# Patient Record
Sex: MALE | Race: WHITE | NOT HISPANIC OR LATINO | URBAN - METROPOLITAN AREA
[De-identification: names, ages, dates, MRNs, and addresses within clinical notes are randomized per-mention and may not be internally consistent; named-entity substitution may affect disease eponyms.]

---

## 2019-11-11 ENCOUNTER — TELEPHONE (OUTPATIENT)
Dept: PHYSICAL MEDICINE AND REHAB | Facility: CLINIC | Age: 69
End: 2019-11-11

## 2019-11-11 NOTE — TELEPHONE ENCOUNTER
----- Message from Ania Bronson sent at 11/11/2019  2:10 PM CST -----  [facility]  Requesting a call back regarding referral sent over on 10/29/19. She would like to know what is taking so long for patient to be scheduled. Advised her that the referral is still pending authorization. She would like to speak to someone. Please call back at [phone]. She states to press '0' and ask the  to get her to the phone.

## 2019-11-12 ENCOUNTER — OFFICE VISIT (OUTPATIENT)
Dept: PHYSICAL MEDICINE AND REHAB | Facility: CLINIC | Age: 69
End: 2019-11-12
Payer: MEDICARE

## 2019-11-12 VITALS
HEIGHT: 76 IN | DIASTOLIC BLOOD PRESSURE: 77 MMHG | SYSTOLIC BLOOD PRESSURE: 154 MMHG | WEIGHT: 179 LBS | BODY MASS INDEX: 21.8 KG/M2 | HEART RATE: 51 BPM

## 2019-11-12 DIAGNOSIS — R26.9 GAIT ABNORMALITY: ICD-10-CM

## 2019-11-12 DIAGNOSIS — M72.2 PLANTAR FASCIITIS, BILATERAL: ICD-10-CM

## 2019-11-12 DIAGNOSIS — M76.31 ILIOTIBIAL BAND SYNDROME OF RIGHT SIDE: Primary | ICD-10-CM

## 2019-11-12 PROCEDURE — 97110 THERAPEUTIC EXERCISES: CPT | Mod: GN,S$PBB,, | Performed by: PHYSICAL MEDICINE & REHABILITATION

## 2019-11-12 PROCEDURE — 99213 OFFICE O/P EST LOW 20 MIN: CPT | Mod: PBBFAC | Performed by: PHYSICAL MEDICINE & REHABILITATION

## 2019-11-12 PROCEDURE — 99999 PR PBB SHADOW E&M-EST. PATIENT-LVL III: ICD-10-PCS | Mod: PBBFAC,,, | Performed by: PHYSICAL MEDICINE & REHABILITATION

## 2019-11-12 PROCEDURE — 99999 PR PBB SHADOW E&M-EST. PATIENT-LVL III: CPT | Mod: PBBFAC,,, | Performed by: PHYSICAL MEDICINE & REHABILITATION

## 2019-11-12 PROCEDURE — 99204 OFFICE O/P NEW MOD 45 MIN: CPT | Mod: S$PBB,,, | Performed by: PHYSICAL MEDICINE & REHABILITATION

## 2019-11-12 PROCEDURE — 99204 PR OFFICE/OUTPT VISIT, NEW, LEVL IV, 45-59 MIN: ICD-10-PCS | Mod: S$PBB,,, | Performed by: PHYSICAL MEDICINE & REHABILITATION

## 2019-11-12 PROCEDURE — 97110 PR THERAPEUTIC EXERCISES: ICD-10-PCS | Mod: GN,S$PBB,, | Performed by: PHYSICAL MEDICINE & REHABILITATION

## 2019-11-12 NOTE — PROGRESS NOTES
PM&R NEW PATIENT HISTORY & PHYSICAL:    Referring Physician: Self, Aaareferral    Chief Complaint   Patient presents with    Hip Pain     Right hip pain    Knee Pain     Right knee pain       HPI: This is a 69 y.o.  male being seen in clinic today for evaluation of Hip Pain (Right hip pain) and Knee Pain (Right knee pain)   He is an avid runner and hiker. Recently trained for and did a 15 mile trail race in [place] several weeks ago. His last training was a 12 mile walk and he developed some pain in the right knee and hip after that. Knee more than the hip was really aching and stiff by the end of that race. He feels aching pain in his right lateral hip and knee. Remote h/o right knee medial meniscus surgery. Recently had PF in bilateral feet, L > R, but has been doing better recently. The symptoms are improving. He has not tried physical therapy. His son is a PT in [place] Lives in [place]. Going to [employer] in January, considering a trail race in February. Hasn't been back to running for years - first because he had shin splint pain and then developed the plantar fasciitis.     He doesn't currently strength train except for a little bit of upper body exercise.       History obtained from patient.    Past family, medical, social, surgical history, and vital signs reviewed in chart.    Review of Systems   Constitutional: Negative for chills, fever and weight loss.   HENT: Negative for hearing loss and sore throat.    Eyes: Negative for blurred vision, photophobia and pain.   Respiratory: Negative for shortness of breath.    Cardiovascular: Negative for chest pain.   Gastrointestinal: Negative for abdominal pain.   Genitourinary: Negative for dysuria.   Skin: Negative for rash.   Neurological: Negative for tingling and headaches.   Endo/Heme/Allergies: Does not bruise/bleed easily.   Psychiatric/Behavioral: Negative for depression.       General    Nursing note and vitals reviewed.  Constitutional: He is oriented to  person, place, and time. He appears well-developed and well-nourished.   HENT:   Head: Normocephalic and atraumatic.   Eyes: Conjunctivae and EOM are normal. Pupils are equal, round, and reactive to light.   Neck: Neck supple.   Cardiovascular: Intact distal pulses.    Pulmonary/Chest: Effort normal. No respiratory distress.   Abdominal: He exhibits no distension.   Neurological: He is alert and oriented to person, place, and time. He has normal reflexes.   Psychiatric: He has a normal mood and affect.     General Musculoskeletal Exam   Gait: normal     Right Ankle/Foot Exam     Comments:  Mild bilateral bunions. Some mid-foot drop in stance. Poor bilateral balance. TTP near PF origin.      Right Knee Exam     Inspection   Erythema: absent  Swelling: absent  Effusion: absent    Tenderness   The patient is tender to palpation of the iliotibial band (Gerdy's tubercle and over greater trochanter).    Range of Motion   The patient has normal right knee ROM.    Tests   Meniscus   Ismael:  Medial - negative Lateral - negative  Ligament Examination Lachman: normal (-1 to 2mm) PCL-Posterior Drawer: normal (0 to 2mm)     MCL - Valgus: normal (0 to 2mm)  LCL - Varus: normal  Patella   Patellar apprehension: negative  Patellar Tracking: normal    Other   Sensation: normal    Comments:  Single Leg Squat Test:  R: knee adduction, trunk lean, loss of first ray contact and foot touching down  L: knee adduction, trunk lean and hands off hips      Left Knee Exam   Left knee exam is normal.    Inspection   Erythema: absent  Swelling: absent  Effusion: absent    Range of Motion   The patient has normal left knee ROM.    Tests   Meniscus   Ismael:  Medial - negative Lateral - negative  Stability Lachman: normal (-1 to 2mm) PCL-Posterior Drawer: normal (0 to 2mm)  MCL - Valgus: normal (0 to 2mm)  LCL - Varus: normal (0 to 2mm)  Patella   Patellar apprehension: negative  Patellar Tracking: normal    Other   Sensation: normal    Right  Hip Exam   Right hip exam is normal.     Tests   Pain w/ forced internal rotation (SHERI): absent  Doe: positive  Left Hip Exam   Left hip exam is normal.    Tests   Pain w/ forced internal rotation (SHERI): absent          Muscle Strength   Right Lower Extremity   Hip Abduction: 4/5   Hip Adduction: 5/5   Hip Flexion: 5/5   Quadriceps:  5/5   Hamstrin/5   Left Lower Extremity   Hip Abduction: 4/5   Hip Adduction: 5/5   Hip Flexion: 5/5   Quadriceps:  5/5   Hamstrin/5     Reflexes     Left Side  Quadriceps:  2+  Achilles:  2+    Right Side   Quadriceps:  2+  Achilles:  2+      IMPRESSION/PLAN: Jasper is a 69 y.o.  male with:    1. Iliotibial band syndrome of right side  - Ambulatory referral to Physical Therapy    2. Plantar fasciitis, bilateral  - Ambulatory referral to Physical Therapy    3. Gait abnormality       The findings were discussed with Jasper in detail. His symptoms and exam are consistent with plantar fasciitis and right IT band syndrome with some greater trochanteric bursitis as well. We discussed the biomechanical factors he has that contributes to this. He'd really benefit from not just rehab, but working on progressive strengthening in general to help him prevent further injuries and then starting his return to run. We reviewed the attached plan below, but didn't have time to practice in detail. I'm sending him to Reji Galdamez at [facility] to be taught how to perform these exercises correctly. All of his questions were answered. At least 15 minutes were spent teaching the patient a therapeutic home exercise program and they were provided this plan in writing.    He was provided this plan in writing. He will follow-up with me in 6 weeks.       Toe Yoga - The purpose is to give you a smarter, stronger, and more stable foot. The big toe accounts for about 85% of our stability, get it stronger! Start with coordination:  1. Keeping little toes down, lift the big toe.  2. Put big toe down,  lift the small toes.  3. Alternate for 20 - 30 seconds before taking a break.  4. Progress from doing this sitting to standing to single leg stance.        For Foot Strength:  1. Pick the little toes up, drive the big toe down without curling it.  2. Gradually hold this squeezing for longer and longer.  3. Again progress from sitting to standing to single leg stance.  4. Start incorporating this with standing exercises like squats and deadlifts and all single leg work.     For a good primer, check out this link - Are You Ready to go Minimal:     https://www.Inkive.com/watch?v=YtICeFOKjIs    Balance Issues - Balance and Proprioception (ability to feel where the body is in space) is important to keep us upright and keep our joints in good position. The foot has to be able to micro-corrections within a few hundredths of a second while running, otherwise problems will show up somewhere. Fortunately, improving balance is one of the quickest things we can do as it is more about neurological control rather than strength. Toe yoga will help keep the great toe on the ground which will give you better control. Good posture keeps weight on the forefoot so you can control side to side forces better. Standing on one leg multiple times throughout the day will improve balance quickly and even develop some endurance in leg and hip muscles.    To improve, practice many times a day. Find good posture, drive the toe down to stabilize the arch, brace your core, and stand on one leg for 20 - 30 seconds, then the other. Advance from quiet standing, to gentle side to side rotation, to quiet standing with eyes closed, to rotation with eyes closed.       Mobilization:     Its generally a good idea to assess yourself for hotspots once a month by briefly mobilizing everything with a  or foam roller, and then spend several minutes every other day of the week, for 4 - 6 weeks working on those painful spots using thumbs, foam roll,  "rolling pins, lacrosse ball, etc. If it hurts, then you need to do it. Once it no longer hurts, take a break on that area for a few months.     Plantar Fascia Mobilization: Apply pressure to a tender spot using thumbs or ball, then flex & extend toes for 20 seconds before moving to a different spot. Alternatively, roll the entire foot for a minute or two. Do this every other day for several months.          IT Band Mobilization: Using 'the Stick', a foam roller, or lacross ball, roll all aspects of the IT band, but not over bony areas, for a minute or two every other day for about 6 weeks. You can also try pausing over a painful area and then flexing and extending the knee for 20 seconds.     From "Anatomy for Runners" by MAXIMILIANO Stringer        Working through these progressions:    Unless otherwise stated, you should only be doing one exercise from each progression listed below. These are listed in order of difficulty, so start with the first one in each list. Do as many reps as you can while maintaining excellent form. Stop doing the exercise if you fatigue or can't maintain proper form. Once you can do the recommended amount of reps for that exercise, stop doing that one and move on to the next exercise in that progression list during your next workout.     Bridge Progression: The purpose is to practice pushing the leg back using the glutes while maintaining a neutral spine. Start with your foot close enough to touch the heel. Brace your core without letting your back arch or flatten, or rotate for single leg exercises. Squeeze the glutes and drive down through the heel. If you feel tightness in the low back, you need to either brace the core more, use the glutes more, or don't lift the hips quite as high. Arms down is easier, arms up is harder.     1. Double leg bridge with arms up: 3 x 10 - 20        2. Bridge march with arms up: 3 x 20 Don't allow the hips to rotate.        3. Single leg bridge arms up: 3 x 10 - " "15 each leg         Hip Abduction Progression:      Lateral control - This is one of the most common problems with runners and can contribute to many different injuries and wasted energy. Improving hip strength will help significantly. As your hips get stronger, think about actively squeezing your glutes when you run - that will help get these muscles firing. Do only 1 of these exercises per workout and advance to the next exercise once you can do the recommended amount of reps.     1. Clam shells: 3 x 30  Stay perpendicular to ground, knees bent 90 degrees, feet lined up even with low back. Brace core & squeeze glutes throughout the exercise. Lift top knee only as far as you can without rolling backwards.           2. Side lying leg lift (keep leg slightly extended): 3 x 15      3. Seven way hips: Start with 3 x 2 - 3 reps and progress to 3 x 8 - 10 reps. Follow this link:  https://www.Bitmenu.com/watch?v=YdenOdoz-MI      4. Standing hip hikes: 3 x 20  (bonus points: hold core tight, good posture with weight on forefoot, and toe yoga)      Intermediate Glute Max Progression:     With these exercises:  - continue to focus on abdominal bracing  - think about using your foot muscles (toe yoga) to prevent the foot from collapsing in  - keep the middle of the kneecap lined up over the second toe  - good control is more important than the speed of the exercise or how many you do!  - OK to do more than one of these at a time, if your glutes can handle it.    1. Chair of Death squats: 3 x 15   Drive hips back, keeping tibia as vertical as possible. The bar Im holding is to remind me not to flex my spine on the way down, or arch my back on the way up. Think "sit back" rather than "squat down".        2. Lunges: 3 x 10 Keep torso upright and dont let knee go past toes. Do multi-directional lunges for bonus points.      3. Hip Hinges: 3 x 15 Use a braced core to keep the spine in a neutral position. A stick can help you to " know if spine is staying in right position. Think hips back, hips forward. Ok to bend the knees slightly to take stretch off of the hamstrings.       4. Single leg deadlifts: 3 x 10    Remember to keep spine straight as well as the leg you are kicking back. Only once you are sure youre using good form should you drop the juli and pick-up a dumbbell or barbell.       Lower Leg Progression:    1. Double leg heel raises: 3 x 20 (besides just picking the heel up, make sure you also invert it, or turn it in, as you come up.)    2. Single leg heel raise: 3 x 15 (same as above, the heel should invert as you come up.)    3. Bonus level: Eccentric calf raises: 3 x 15 - not sure if it helps tib post as much, but can give you bigger stronger Achilleswhich is essentially a big spring giving you free energy.        5. Single leg kettlebell press: 3 x 10 - 15      6. Single Leg Kettleball Swap: 3 x 30 seconds: See this link  https://www.FRUCT.com/watch?v=hcXKajLSCQs        Safe Start to Running  Unless you have a track nearby or well-known distances, it is much easier to buy a wristwatch and exercise by time. Start with walking a distance/time that you can do without much difficulty.  Aim for 3 days a week. When you can comfortably walk 30 minutes, then you can start running.  Walk 4:30, then jog 30 seconds (this is like one set), and repeat 6 times for a total exercise time of 30 minutes. Alternative method: walk 9 minutes and jog 1 minute, repeat 3 times for 30 minutes total.  Each week decrease the walk time and increase the run time by 30 seconds for each set.  Alternative: As above but change by 1 minute (walk 8 min, jog 2 min, X 3 sets).    As your running time increases, you may want to consider adding an initial walking time to warm-up before the run, but dont count this toward the 30 minute total.  When you can run 30 minutes straight, you may consider adding an additional day of running.  Again, start this fourth  day at less of a total run time, but you should be able to build up time much more quickly.    Always remember the activity guidelines:  1. No running with pain > 3/10 on the 0 - 10 pain scale.  2. No running if limping or changing your gait.  3. Ok to run with a pain that goes away with running, but NOT ok to run with a pain that gets worse the further you go.  4. Increase your total weekly mileage or time by no more than 10%    Its better to go too slow than too fast! Not only do your heart and lungs need to adapt, but it takes your bones, muscles, ligaments, and tendons weeks to months to adapt to the strain of running. You cant rush greatness!    Once you do start running, check on your anai:    Anai - We want to speed up the leg turnover, but take shorter strides so that your pace doesnt change. Once you have warmed up and settled into your run, count how many times one foot hits the ground in 30 seconds, then multiply by 2. Try to get to at least 84, then possibly work up to higher numbers when running faster. If the number is less than 84, try shortening your stride and turning over quicker for a few minutes, and then forget about it - just run! 5 minutes later, repeat the process. Or try running with a metronome idania or maybe one of these apps: http://www.runningmetronome.org/top-5-running-apps/ Gradually youll find your anai picking up.    Suly Douglas M.D.  Physical Medicine and Rehab

## 2019-11-12 NOTE — PATIENT INSTRUCTIONS
Toe Yoga - The purpose is to give you a smarter, stronger, and more stable foot. The big toe accounts for about 85% of our stability, get it stronger! Start with coordination:  1. Keeping little toes down, lift the big toe.  2. Put big toe down, lift the small toes.  3. Alternate for 20 - 30 seconds before taking a break.  4. Progress from doing this sitting to standing to single leg stance.        For Foot Strength:  1. Pick the little toes up, drive the big toe down without curling it.  2. Gradually hold this squeezing for longer and longer.  3. Again progress from sitting to standing to single leg stance.  4. Start incorporating this with standing exercises like squats and deadlifts and all single leg work.     For a good primer, check out this link - Are You Ready to go Minimal:     https://www.[employer].com/watch?v=YtICeFOKjIs    Balance Issues - Balance and Proprioception (ability to feel where the body is in space) is important to keep us upright and keep our joints in good position. The foot has to be able to micro-corrections within a few hundredths of a second while running, otherwise problems will show up somewhere. Fortunately, improving balance is one of the quickest things we can do as it is more about neurological control rather than strength. Toe yoga will help keep the great toe on the ground which will give you better control. Good posture keeps weight on the forefoot so you can control side to side forces better. Standing on one leg multiple times throughout the day will improve balance quickly and even develop some endurance in leg and hip muscles.    To improve, practice many times a day. Find good posture, drive the toe down to stabilize the arch, brace your core, and stand on one leg for 20 - 30 seconds, then the other. Advance from quiet standing, to gentle side to side rotation, to quiet standing with eyes closed, to rotation with eyes closed.       Mobilization:     Its generally a good idea  "to assess yourself for hotspots once a month by briefly mobilizing everything with a  or foam roller, and then spend several minutes every other day of the week, for 4 - 6 weeks working on those painful spots using thumbs, foam roll, rolling pins, lacrosse ball, etc. If it hurts, then you need to do it. Once it no longer hurts, take a break on that area for a few months.     Plantar Fascia Mobilization: Apply pressure to a tender spot using thumbs or ball, then flex & extend toes for 20 seconds before moving to a different spot. Alternatively, roll the entire foot for a minute or two. Do this every other day for several months.          IT Band Mobilization: Using 'the Stick', a foam roller, or lacross ball, roll all aspects of the IT band, but not over bony areas, for a minute or two every other day for about 6 weeks. You can also try pausing over a painful area and then flexing and extending the knee for 20 seconds.     From "Anatomy for Runners" by MAXIMILIANO Stringer        Working through these progressions:    Unless otherwise stated, you should only be doing one exercise from each progression listed below. These are listed in order of difficulty, so start with the first one in each list. Do as many reps as you can while maintaining excellent form. Stop doing the exercise if you fatigue or can't maintain proper form. Once you can do the recommended amount of reps for that exercise, stop doing that one and move on to the next exercise in that progression list during your next workout.     Bridge Progression: The purpose is to practice pushing the leg back using the glutes while maintaining a neutral spine. Start with your foot close enough to touch the heel. Brace your core without letting your back arch or flatten, or rotate for single leg exercises. Squeeze the glutes and drive down through the heel. If you feel tightness in the low back, you need to either brace the core more, use the glutes more, or " "don't lift the hips quite as high. Arms down is easier, arms up is harder.     1. Double leg bridge with arms up: 3 x 10 - 20        2. Bridge march with arms up: 3 x 20 Don't allow the hips to rotate.        3. Single leg bridge arms up: 3 x 10 - 15 each leg         Hip Abduction Progression:      Lateral control - This is one of the most common problems with runners and can contribute to many different injuries and wasted energy. Improving hip strength will help significantly. As your hips get stronger, think about actively squeezing your glutes when you run - that will help get these muscles firing. Do only 1 of these exercises per workout and advance to the next exercise once you can do the recommended amount of reps.     1. Clam shells: 3 x 30  Stay perpendicular to ground, knees bent 90 degrees, feet lined up even with low back. Brace core & squeeze glutes throughout the exercise. Lift top knee only as far as you can without rolling backwards.           2. Side lying leg lift (keep leg slightly extended): 3 x 15      3. Seven way hips: Start with 3 x 2 - 3 reps and progress to 3 x 8 - 10 reps. Follow this link:  https://www.[employer].com/watch?v=YdenOdoz-MI      4. Standing hip hikes: 3 x 20  (bonus points: hold core tight, good posture with weight on forefoot, and toe yoga)      Intermediate Glute Max Progression:     With these exercises:  - continue to focus on abdominal bracing  - think about using your foot muscles (toe yoga) to prevent the foot from collapsing in  - keep the middle of the kneecap lined up over the second toe  - good control is more important than the speed of the exercise or how many you do!  - OK to do more than one of these at a time, if your glutes can handle it.    1. Chair of Death squats: 3 x 15   Drive hips back, keeping tibia as vertical as possible. The bar Im holding is to remind me not to flex my spine on the way down, or arch my back on the way up. Think "sit back" rather than " ""squat down".        2. Lunges: 3 x 10 Keep torso upright and dont let knee go past toes. Do multi-directional lunges for bonus points.      3. Hip Hinges: 3 x 15 Use a braced core to keep the spine in a neutral position. A stick can help you to know if spine is staying in right position. Think hips back, hips forward. Ok to bend the knees slightly to take stretch off of the hamstrings.       4. Single leg deadlifts: 3 x 10    Remember to keep spine straight as well as the leg you are kicking back. Only once you are sure youre using good form should you drop the juli and pick-up a dumbbell or barbell.       Lower Leg Progression:    1. Double leg heel raises: 3 x 20 (besides just picking the heel up, make sure you also invert it, or turn it in, as you come up.)    2. Single leg heel raise: 3 x 15 (same as above, the heel should invert as you come up.)    3. Bonus level: Eccentric calf raises: 3 x 15 - not sure if it helps tib post as much, but can give you bigger stronger Achilleswhich is essentially a big spring giving you free energy.        5. Single leg kettlebell press: 3 x 10 - 15      6. Single Leg Kettleball Swap: 3 x 30 seconds: See this link  https://www.[employer].com/watch?v=hcXKajLSCQs        Safe Start to Running  Unless you have a track nearby or well-known distances, it is much easier to buy a wristwatch and exercise by time. Start with walking a distance/time that you can do without much difficulty.  Aim for 3 days a week. When you can comfortably walk 30 minutes, then you can start running.  Walk 4:30, then jog 30 seconds (this is like one set), and repeat 6 times for a total exercise time of 30 minutes. Alternative method: walk 9 minutes and jog 1 minute, repeat 3 times for 30 minutes total.  Each week decrease the walk time and increase the run time by 30 seconds for each set.  Alternative: As above but change by 1 minute (walk 8 min, jog 2 min, X 3 sets).    As your running time increases, you " may want to consider adding an initial walking time to warm-up before the run, but dont count this toward the 30 minute total.  When you can run 30 minutes straight, you may consider adding an additional day of running.  Again, start this fourth day at less of a total run time, but you should be able to build up time much more quickly.    Always remember the activity guidelines:  1. No running with pain > 3/10 on the 0 - 10 pain scale.  2. No running if limping or changing your gait.  3. Ok to run with a pain that goes away with running, but NOT ok to run with a pain that gets worse the further you go.  4. Increase your total weekly mileage or time by no more than 10%    Its better to go too slow than too fast! Not only do your heart and lungs need to adapt, but it takes your bones, muscles, ligaments, and tendons weeks to months to adapt to the strain of running. You cant rush greatness!    Once you do start running, check on your anai:    Anai - We want to speed up the leg turnover, but take shorter strides so that your pace doesnt change. Once you have warmed up and settled into your run, count how many times one foot hits the ground in 30 seconds, then multiply by 2. Try to get to at least 84, then possibly work up to higher numbers when running faster. If the number is less than 84, try shortening your stride and turning over quicker for a few minutes, and then forget about it - just run! 5 minutes later, repeat the process. Or try running with a metronome idania or maybe one of these apps: http://www.runningmetronome.org/top-5-running-apps/ Gradually youll find your anai picking up.

## 2019-11-13 ENCOUNTER — TELEPHONE (OUTPATIENT)
Dept: RHEUMATOLOGY | Facility: CLINIC | Age: 69
End: 2019-11-13

## 2019-11-13 NOTE — TELEPHONE ENCOUNTER
Faxed referral for Physical Therapy to [facility] @ [phone]. Received fax confirmation.

## 2019-12-17 ENCOUNTER — TELEPHONE (OUTPATIENT)
Dept: PHYSICAL MEDICINE AND REHAB | Facility: CLINIC | Age: 69
End: 2019-12-17

## 2019-12-17 NOTE — PROGRESS NOTES
PM&R Follow Up Visit :    Referring Physician: No ref. provider found    Chief Complaint   Patient presents with    Hip Pain     Right hip pain    Knee Pain     Right knee pain       HPI: This is a 69 y.o.  male being seen in clinic today for evaluation of Hip Pain (Right hip pain) and Knee Pain (Right knee pain)   Since last visit, the symptoms are improving.  He has been to therapy, once a week with Reji at [facility]. The plantar fasciitis has improved quite a bit, the right knee less so. Still with pain/tightness in right lateral knee. Pain is near proximal tib/fib joint with occasional radiation down the lateral leg. Is now aware of right knee adduction collapse with squatting and is working on PT/HEP to correct this, but pain hasn't changed much. Comes on after 2 miles of brisk walking. Taking 2 week mission trip to [facility] in early January, but will have a few more PT visits first. Feet feel at least 50% better. Is using Lems and Altra shoes now, plus got the toe spacers. Had big toe pain at first, but not now.    History obtained from patient.    Past family, medical, social, surgical history, and vital signs reviewed in chart.    Review of Systems   Constitutional: Negative for chills, fever and weight loss.   HENT: Negative for hearing loss and sore throat.    Eyes: Negative for blurred vision, photophobia and pain.   Respiratory: Negative for shortness of breath.    Cardiovascular: Negative for chest pain.   Gastrointestinal: Negative for abdominal pain.   Genitourinary: Negative for dysuria.   Skin: Negative for rash.   Neurological: Negative for tingling and headaches.   Endo/Heme/Allergies: Does not bruise/bleed easily.   Psychiatric/Behavioral: Negative for depression.       General    Nursing note and vitals reviewed.  Constitutional: He is oriented to person, place, and time. He appears well-developed and well-nourished.   HENT:   Head: Normocephalic and atraumatic.   Eyes: Conjunctivae and EOM are  normal. Pupils are equal, round, and reactive to light.   Neck: Neck supple.   Cardiovascular: Intact distal pulses.    Pulmonary/Chest: Effort normal. No respiratory distress.   Abdominal: He exhibits no distension.   Neurological: He is alert and oriented to person, place, and time. He has normal reflexes.   Psychiatric: He has a normal mood and affect.     General Musculoskeletal Exam   Gait: normal     Right Ankle/Foot Exam     Comments:  Mild bilateral bunions. Some mid-foot drop in stance. Poor bilateral balance. TTP near PF origin.      Right Knee Exam     Inspection   Erythema: absent  Swelling: absent  Effusion: absent    Tenderness   The patient is tender to palpation of the iliotibial band (Gerdy's tubercle and over greater trochanter).    Range of Motion   The patient has normal right knee ROM.    Tests   Meniscus   Ismael:  Medial - negative Lateral - negative  Ligament Examination Lachman: normal (-1 to 2mm) PCL-Posterior Drawer: normal (0 to 2mm)     MCL - Valgus: normal (0 to 2mm)  LCL - Varus: normal  Patella   Patellar apprehension: negative  Patellar Tracking: normal    Other   Sensation: normal    Comments:  Single Leg Squat Test:  R: knee adduction, trunk lean, loss of first ray contact and foot touching down  L: knee adduction, trunk lean and hands off hips    Still with some gross balance deficits.    Left Knee Exam   Left knee exam is normal.    Inspection   Erythema: absent  Swelling: absent  Effusion: absent    Range of Motion   The patient has normal left knee ROM.    Tests   Meniscus   Ismael:  Medial - negative Lateral - negative  Stability Lachman: normal (-1 to 2mm) PCL-Posterior Drawer: normal (0 to 2mm)  MCL - Valgus: normal (0 to 2mm)  LCL - Varus: normal (0 to 2mm)  Patella   Patellar apprehension: negative  Patellar Tracking: normal    Other   Sensation: normal    Right Hip Exam   Right hip exam is normal.     Tests   Pain w/ forced internal rotation (SHERI): absent  Doe:  positive  Left Hip Exam   Left hip exam is normal.    Tests   Pain w/ forced internal rotation (SHERI): absent          Muscle Strength   Right Lower Extremity   Hip Abduction: 4/5   Hip Adduction: 5/5   Hip Flexion: 5/5   Quadriceps:  5/5   Hamstrin/5   Left Lower Extremity   Hip Abduction: 4/5   Hip Adduction: 5/5   Hip Flexion: 5/5   Quadriceps:  5/5   Hamstrin/5     Reflexes     Left Side  Quadriceps:  2+  Achilles:  2+    Right Side   Quadriceps:  2+  Achilles:  2+      IMPRESSION/PLAN: This is a 69 y.o.  male with:    Iliotibial band syndrome of right side  -     Cancel: X-Ray Knee 3 View Right; Future; Expected date: 2019    Plantar fasciitis, bilateral    Gait abnormality    Chronic pain of right knee  -     Cancel: X-Ray Knee 3 View Right; Future; Expected date: 2019      The findings were discussed with Jasper in detail. I'm glad his feet are already improving this much. He'll use the correct toes as he wants and continue the foot exercises. He was reminded to incorporate foot doming into his standing exercises. He is doing appropriate hip strengthening, but we will bump it up a bit to the 'Shai' exercises and reviewed those today. As his pain is in a slightly weird place, I want to get x-rays today to assess lateral tibial plateau and tibiofibular joint. Otherwise he'll continue therapy and I'll ask PT to assess the tib/fib joint as well.   He was provided with this plan in writing. All of his questions were answered. He will follow up with me in 6 weeks.     Suly Douglas M.D.  Physical Medicine and Rehab

## 2019-12-18 ENCOUNTER — OFFICE VISIT (OUTPATIENT)
Dept: PHYSICAL MEDICINE AND REHAB | Facility: CLINIC | Age: 69
End: 2019-12-18
Payer: MEDICARE

## 2019-12-18 ENCOUNTER — HOSPITAL ENCOUNTER (OUTPATIENT)
Dept: RADIOLOGY | Facility: HOSPITAL | Age: 69
Discharge: HOME | End: 2019-12-18
Attending: PHYSICAL MEDICINE & REHABILITATION
Payer: MEDICARE

## 2019-12-18 VITALS
HEART RATE: 50 BPM | HEIGHT: 76 IN | BODY MASS INDEX: 21.8 KG/M2 | SYSTOLIC BLOOD PRESSURE: 126 MMHG | WEIGHT: 179 LBS | DIASTOLIC BLOOD PRESSURE: 76 MMHG

## 2019-12-18 DIAGNOSIS — R26.9 GAIT ABNORMALITY: ICD-10-CM

## 2019-12-18 DIAGNOSIS — M72.2 PLANTAR FASCIITIS, BILATERAL: ICD-10-CM

## 2019-12-18 DIAGNOSIS — M76.31 ILIOTIBIAL BAND SYNDROME OF RIGHT SIDE: ICD-10-CM

## 2019-12-18 DIAGNOSIS — G89.29 CHRONIC PAIN OF RIGHT KNEE: ICD-10-CM

## 2019-12-18 DIAGNOSIS — M76.31 ILIOTIBIAL BAND SYNDROME OF RIGHT SIDE: Primary | ICD-10-CM

## 2019-12-18 DIAGNOSIS — M25.561 CHRONIC PAIN OF RIGHT KNEE: ICD-10-CM

## 2019-12-18 PROCEDURE — 99999 PR PBB SHADOW E&M-EST. PATIENT-LVL III: ICD-10-PCS | Mod: PBBFAC,,, | Performed by: PHYSICAL MEDICINE & REHABILITATION

## 2019-12-18 PROCEDURE — 73562 X-RAY EXAM OF KNEE 3: CPT | Mod: 26,RT,, | Performed by: RADIOLOGY

## 2019-12-18 PROCEDURE — 1125F PR PAIN SEVERITY QUANTIFIED, PAIN PRESENT: ICD-10-PCS | Mod: ,,, | Performed by: PHYSICAL MEDICINE & REHABILITATION

## 2019-12-18 PROCEDURE — 99214 PR OFFICE/OUTPT VISIT, EST, LEVL IV, 30-39 MIN: ICD-10-PCS | Mod: S$PBB,,, | Performed by: PHYSICAL MEDICINE & REHABILITATION

## 2019-12-18 PROCEDURE — 73562 XR KNEE ORTHO RIGHT: ICD-10-PCS | Mod: 26,RT,, | Performed by: RADIOLOGY

## 2019-12-18 PROCEDURE — 73560 XR KNEE ORTHO RIGHT: ICD-10-PCS | Mod: 26,59,LT, | Performed by: RADIOLOGY

## 2019-12-18 PROCEDURE — 99999 PR PBB SHADOW E&M-EST. PATIENT-LVL III: CPT | Mod: PBBFAC,,, | Performed by: PHYSICAL MEDICINE & REHABILITATION

## 2019-12-18 PROCEDURE — 73562 X-RAY EXAM OF KNEE 3: CPT | Mod: TC,RT

## 2019-12-18 PROCEDURE — 73560 X-RAY EXAM OF KNEE 1 OR 2: CPT | Mod: 26,59,LT, | Performed by: RADIOLOGY

## 2019-12-18 PROCEDURE — 1125F AMNT PAIN NOTED PAIN PRSNT: CPT | Mod: ,,, | Performed by: PHYSICAL MEDICINE & REHABILITATION

## 2019-12-18 PROCEDURE — 1159F PR MEDICATION LIST DOCUMENTED IN MEDICAL RECORD: ICD-10-PCS | Mod: ,,, | Performed by: PHYSICAL MEDICINE & REHABILITATION

## 2019-12-18 PROCEDURE — 1159F MED LIST DOCD IN RCRD: CPT | Mod: ,,, | Performed by: PHYSICAL MEDICINE & REHABILITATION

## 2019-12-18 PROCEDURE — 73560 X-RAY EXAM OF KNEE 1 OR 2: CPT | Mod: TC,LT

## 2019-12-18 PROCEDURE — 99214 OFFICE O/P EST MOD 30 MIN: CPT | Mod: S$PBB,,, | Performed by: PHYSICAL MEDICINE & REHABILITATION

## 2019-12-18 PROCEDURE — 99213 OFFICE O/P EST LOW 20 MIN: CPT | Mod: PBBFAC,25 | Performed by: PHYSICAL MEDICINE & REHABILITATION

## 2019-12-18 NOTE — PATIENT INSTRUCTIONS
Hip Abduction Progression: Try to do this daily.     1. Shai hip abduction exercises: 3 x 8 - 12 (Start with 8 reps. Once you build up to 12, use a tighter band.) (Not so much a progression, do all exercises each workout)    A. Theraband pulling straight out to side.           B. Theraband pulling 45 degrees posterior.              C. Standing hip rotations with resistance: https://www.youtube.com/watch?v=gfHmUIuDmG0

## 2020-01-28 NOTE — PROGRESS NOTES
PM&R Follow Up Visit :    Referring Physician: No ref. provider found    Chief Complaint   Patient presents with    Hip Pain     Right hip pain    Knee Pain     Right knee pain       HPI: This is a 70 y.o.  male being seen in clinic today for evaluation of Hip Pain (Right hip pain) and Knee Pain (Right knee pain)   Since last visit, the symptoms are improving.  He has not been back to therapy. He did well with his trip to [facility], but wasn't able to exercise during that time. He's been back to [facility] for about 2 weeks now, doing the plan from [employer]. His area of lateral knee pain has shrunk in size and now mostly feels it right at fibular head. Worse with driving or prolonged working. Occasionally radiates into lateral leg, but denies numbness, tingling, weakness. Still struggles with balance and works on it for several minutes at end of his workout. Plantar fascia is still there, but slowly improving. Right hip hurts mostly during exercise and notes it still feels weaker on right than left.      History obtained from patient.    Past family, medical, social, surgical history, and vital signs reviewed in chart.    Review of Systems   Constitutional: Negative for chills, fever and weight loss.   HENT: Negative for hearing loss and sore throat.    Eyes: Negative for blurred vision, photophobia and pain.   Respiratory: Negative for shortness of breath.    Cardiovascular: Negative for chest pain.   Gastrointestinal: Negative for abdominal pain.   Genitourinary: Negative for dysuria.   Skin: Negative for rash.   Neurological: Negative for tingling and headaches.   Endo/Heme/Allergies: Does not bruise/bleed easily.   Psychiatric/Behavioral: Negative for depression.       General    Nursing note and vitals reviewed.  Constitutional: He is oriented to person, place, and time. He appears well-developed and well-nourished.   HENT:   Head: Normocephalic and atraumatic.   Eyes: Conjunctivae and EOM are normal. Pupils are  equal, round, and reactive to light.   Neck: Neck supple.   Cardiovascular: Intact distal pulses.    Pulmonary/Chest: Effort normal. No respiratory distress.   Abdominal: He exhibits no distension.   Neurological: He is alert and oriented to person, place, and time. He has normal reflexes.   Psychiatric: He has a normal mood and affect.     General Musculoskeletal Exam   Gait: normal     Right Ankle/Foot Exam     Comments:  Mild bilateral bunions. Some mid-foot drop in stance. Poor bilateral balance. TTP near PF origin.      Right Knee Exam     Inspection   Erythema: absent  Swelling: absent  Effusion: absent    Tenderness   Right knee tenderness location: minimal tenderness near fibular head. Negative Tinel's.     Range of Motion   The patient has normal right knee ROM.    Tests   Meniscus   Ismael:  Medial - negative Lateral - negative  Ligament Examination Lachman: normal (-1 to 2mm) PCL-Posterior Drawer: normal (0 to 2mm)     MCL - Valgus: normal (0 to 2mm)  LCL - Varus: normal  Patella   Patellar apprehension: negative  Patellar Tracking: normal    Other   Sensation: normal    Comments:  Still with some gross balance deficits.    Left Knee Exam   Left knee exam is normal.    Inspection   Erythema: absent  Swelling: absent  Effusion: absent    Range of Motion   The patient has normal left knee ROM.    Tests   Meniscus   Ismael:  Medial - negative Lateral - negative  Stability Lachman: normal (-1 to 2mm) PCL-Posterior Drawer: normal (0 to 2mm)  MCL - Valgus: normal (0 to 2mm)  LCL - Varus: normal (0 to 2mm)  Patella   Patellar apprehension: negative  Patellar Tracking: normal    Other   Sensation: normal    Right Hip Exam   Right hip exam is normal.     Tests   Pain w/ forced internal rotation (SHERI): absent  Doe: positive (improving)  Log Roll: negative    Other   Sensation: normal    Comments:  Demonstrates incorrect form with Shai series, kicking straight back instead of 45 degrees. Still shows  relatively poor single leg balance.   Left Hip Exam   Left hip exam is normal.    Tests   Pain w/ forced internal rotation (SHERI): absent    Other   Sensation: normal          Muscle Strength   Right Lower Extremity   Hip Abduction: 4/5   Hip Adduction: 5/5   Hip Flexion: 5/5   Quadriceps:  5/5   Hamstrin/5   Ankle Dorsiflexion:  5/5   Left Lower Extremity   Hip Abduction: 5/5   Hip Adduction: 5/5   Hip Flexion: 5/5   Quadriceps:  5/5   Hamstrin/5   Ankle Dorsiflexion:  5/5     Reflexes     Left Side  Quadriceps:  2+  Achilles:  2+    Right Side   Quadriceps:  2+  Achilles:  2+    Vascular Exam     Right Pulses  Dorsalis Pedis:      2+          Left Pulses  Dorsalis Pedis:      2+                IMPRESSION/PLAN: This is a 70 y.o.  male with:    Iliotibial band syndrome of right side    Plantar fasciitis, bilateral    Chronic pain of right knee    Gait abnormality        The findings were discussed with Jasper in detail. Today I think this may be more from insertional hamstring issue on the right fib head. We discussed adding in more hamstring exercises with RDLs. He had trouble with this motion in clinic but improved with cueing. Also modified how he was doing Shai exercises. I think he could also benefit from rocker board work and discussed [employer]OB[employer] board vs home made rocker board. Either way, he should add in exercises from [employer]. He wants to try these changes on his own for a week and will let me know if wants to go to PT again.  He was provided with this plan in writing. All of his questions were answered. He will follow up with me in 4 weeks.     Suly Douglas M.D.  Physical Medicine and Rehab

## 2020-01-29 ENCOUNTER — OFFICE VISIT (OUTPATIENT)
Dept: PHYSICAL MEDICINE AND REHAB | Facility: CLINIC | Age: 70
End: 2020-01-29
Payer: MEDICARE

## 2020-01-29 VITALS
HEIGHT: 76 IN | SYSTOLIC BLOOD PRESSURE: 154 MMHG | WEIGHT: 181.88 LBS | BODY MASS INDEX: 22.15 KG/M2 | HEART RATE: 55 BPM | DIASTOLIC BLOOD PRESSURE: 83 MMHG

## 2020-01-29 DIAGNOSIS — G89.29 CHRONIC PAIN OF RIGHT KNEE: ICD-10-CM

## 2020-01-29 DIAGNOSIS — M72.2 PLANTAR FASCIITIS, BILATERAL: ICD-10-CM

## 2020-01-29 DIAGNOSIS — R26.9 GAIT ABNORMALITY: ICD-10-CM

## 2020-01-29 DIAGNOSIS — M25.561 CHRONIC PAIN OF RIGHT KNEE: ICD-10-CM

## 2020-01-29 DIAGNOSIS — M76.31 ILIOTIBIAL BAND SYNDROME OF RIGHT SIDE: Primary | ICD-10-CM

## 2020-01-29 PROCEDURE — 99999 PR PBB SHADOW E&M-EST. PATIENT-LVL III: ICD-10-PCS | Mod: PBBFAC,,, | Performed by: PHYSICAL MEDICINE & REHABILITATION

## 2020-01-29 PROCEDURE — 1125F AMNT PAIN NOTED PAIN PRSNT: CPT | Mod: ,,, | Performed by: PHYSICAL MEDICINE & REHABILITATION

## 2020-01-29 PROCEDURE — 99214 PR OFFICE/OUTPT VISIT, EST, LEVL IV, 30-39 MIN: ICD-10-PCS | Mod: S$PBB,,, | Performed by: PHYSICAL MEDICINE & REHABILITATION

## 2020-01-29 PROCEDURE — 99213 OFFICE O/P EST LOW 20 MIN: CPT | Mod: PBBFAC | Performed by: PHYSICAL MEDICINE & REHABILITATION

## 2020-01-29 PROCEDURE — 1159F PR MEDICATION LIST DOCUMENTED IN MEDICAL RECORD: ICD-10-PCS | Mod: ,,, | Performed by: PHYSICAL MEDICINE & REHABILITATION

## 2020-01-29 PROCEDURE — 1125F PR PAIN SEVERITY QUANTIFIED, PAIN PRESENT: ICD-10-PCS | Mod: ,,, | Performed by: PHYSICAL MEDICINE & REHABILITATION

## 2020-01-29 PROCEDURE — 1159F MED LIST DOCD IN RCRD: CPT | Mod: ,,, | Performed by: PHYSICAL MEDICINE & REHABILITATION

## 2020-01-29 PROCEDURE — 99999 PR PBB SHADOW E&M-EST. PATIENT-LVL III: CPT | Mod: PBBFAC,,, | Performed by: PHYSICAL MEDICINE & REHABILITATION

## 2020-01-29 PROCEDURE — 99214 OFFICE O/P EST MOD 30 MIN: CPT | Mod: S$PBB,,, | Performed by: PHYSICAL MEDICINE & REHABILITATION

## 2020-01-29 NOTE — PATIENT INSTRUCTIONS
"Belgian Deadlift practice with stick: 10 - 20 reps, enough sets to get it right.    The goal is to the stick contacting the back evenly throughout and prevent rounding the spine on the way down or over-arching it on the way up. Hinge at the hips, keep glutes squeezed throughout the movement, allow 'soft knees' to relieve some tension from the hamstrings, but don't bend the knees nearly as much as squatting.      Belgian Deadlifts: 2 - 4 sets of 3 - 8 reps      Single Leg Belgian Deadlfits: 2 - 4 sets of 3 - 8 reps        [email or web address]    Look at exercise videos for ideas. Exercises can be done on solid ground too. Can make your own board with a 14 x 17" piece of plywood with a 2 x 2 running lengthwise down the middle.  Or order one with discount code [id number]  "

## 2020-02-07 DIAGNOSIS — M25.561 CHRONIC PAIN OF RIGHT KNEE: ICD-10-CM

## 2020-02-07 DIAGNOSIS — G89.29 CHRONIC PAIN OF RIGHT KNEE: ICD-10-CM

## 2020-02-07 DIAGNOSIS — M76.31 ILIOTIBIAL BAND SYNDROME OF RIGHT SIDE: Primary | ICD-10-CM

## 2020-02-07 DIAGNOSIS — M72.2 PLANTAR FASCIITIS, BILATERAL: ICD-10-CM

## 2020-02-24 ENCOUNTER — OFFICE VISIT (OUTPATIENT)
Dept: PHYSICAL MEDICINE AND REHAB | Facility: CLINIC | Age: 70
End: 2020-02-24
Payer: MEDICARE

## 2020-02-24 VITALS
DIASTOLIC BLOOD PRESSURE: 79 MMHG | BODY MASS INDEX: 22.15 KG/M2 | SYSTOLIC BLOOD PRESSURE: 125 MMHG | HEIGHT: 76 IN | WEIGHT: 181.88 LBS | HEART RATE: 57 BPM

## 2020-02-24 DIAGNOSIS — M76.31 ILIOTIBIAL BAND SYNDROME OF RIGHT SIDE: Primary | ICD-10-CM

## 2020-02-24 DIAGNOSIS — G89.29 CHRONIC PAIN OF RIGHT KNEE: ICD-10-CM

## 2020-02-24 DIAGNOSIS — M25.561 CHRONIC PAIN OF RIGHT KNEE: ICD-10-CM

## 2020-02-24 DIAGNOSIS — R26.9 GAIT ABNORMALITY: ICD-10-CM

## 2020-02-24 PROCEDURE — 99999 PR PBB SHADOW E&M-EST. PATIENT-LVL III: ICD-10-PCS | Mod: PBBFAC,,, | Performed by: PHYSICAL MEDICINE & REHABILITATION

## 2020-02-24 PROCEDURE — 99214 PR OFFICE/OUTPT VISIT, EST, LEVL IV, 30-39 MIN: ICD-10-PCS | Mod: S$PBB,,, | Performed by: PHYSICAL MEDICINE & REHABILITATION

## 2020-02-24 PROCEDURE — 99213 OFFICE O/P EST LOW 20 MIN: CPT | Mod: PBBFAC | Performed by: PHYSICAL MEDICINE & REHABILITATION

## 2020-02-24 PROCEDURE — 99999 PR PBB SHADOW E&M-EST. PATIENT-LVL III: CPT | Mod: PBBFAC,,, | Performed by: PHYSICAL MEDICINE & REHABILITATION

## 2020-02-24 PROCEDURE — 99214 OFFICE O/P EST MOD 30 MIN: CPT | Mod: S$PBB,,, | Performed by: PHYSICAL MEDICINE & REHABILITATION

## 2020-02-24 NOTE — PROGRESS NOTES
PM&R Follow Up Visit :    Referring Physician: No ref. provider found    Chief Complaint   Patient presents with    Hip Pain     Right hip pain    Knee Pain     Right knee pain       HPI: This is a 70 y.o.  male being seen in clinic today for evaluation of Hip Pain (Right hip pain) and Knee Pain (Right knee pain)   Since last visit, the symptoms show no change.  He has been to therapy, but only for a few visits. He's recently started using the [employer] board with some of his exercises and notes a big side to side balance difference, worse on injured leg. Hip is doing better, but still pain near lateral right knee after 2 mile jog, with a few episodes of very fleeting, but very sharp near left fibular head. Happens with changing leg positions only once already sore from running. Denies numbness/tingling/night pain.       History obtained from patient.    Past family, medical, social, surgical history, and vital signs reviewed in chart.    Review of Systems   Constitutional: Negative for chills, fever and weight loss.   HENT: Negative for hearing loss and sore throat.    Eyes: Negative for blurred vision, photophobia and pain.   Respiratory: Negative for shortness of breath.    Cardiovascular: Negative for chest pain.   Gastrointestinal: Negative for abdominal pain.   Genitourinary: Negative for dysuria.   Skin: Negative for rash.   Neurological: Negative for tingling and headaches.   Endo/Heme/Allergies: Does not bruise/bleed easily.   Psychiatric/Behavioral: Negative for depression.       General    Nursing note and vitals reviewed.  Constitutional: He is oriented to person, place, and time. He appears well-developed and well-nourished.   HENT:   Head: Normocephalic and atraumatic.   Eyes: Conjunctivae and EOM are normal. Pupils are equal, round, and reactive to light.   Neck: Neck supple.   Cardiovascular: Intact distal pulses.    Pulmonary/Chest: Effort normal. No respiratory distress.   Abdominal: He exhibits no  distension.   Neurological: He is alert and oriented to person, place, and time. He has normal reflexes.   Psychiatric: He has a normal mood and affect.     General Musculoskeletal Exam   Gait: normal     Right Ankle/Foot Exam     Comments:  Mild bilateral bunions. Some mid-foot drop in stance. Poor bilateral balance. TTP near PF origin.      Right Knee Exam     Inspection   Erythema: absent  Swelling: absent  Effusion: absent    Tenderness   Right knee tenderness location: minimal tenderness near fibular head. Negative Tinel's.     Range of Motion   The patient has normal right knee ROM.    Tests   Meniscus   Ismael:  Medial - negative Lateral - negative  Ligament Examination Lachman: normal (-1 to 2mm) PCL-Posterior Drawer: normal (0 to 2mm)     MCL - Valgus: normal (0 to 2mm)  LCL - Varus: normal  Patella   Patellar apprehension: negative  Patellar Tracking: normal    Other   Sensation: normal    Comments:  Still with some gross balance deficits.    Left Knee Exam   Left knee exam is normal.    Inspection   Erythema: absent  Swelling: absent  Effusion: absent    Range of Motion   The patient has normal left knee ROM.    Tests   Meniscus   Ismael:  Medial - negative Lateral - negative  Stability Lachman: normal (-1 to 2mm) PCL-Posterior Drawer: normal (0 to 2mm)  MCL - Valgus: normal (0 to 2mm)  LCL - Varus: normal (0 to 2mm)  Patella   Patellar apprehension: negative  Patellar Tracking: normal    Other   Sensation: normal    Right Hip Exam   Right hip exam is normal.     Tests   Pain w/ forced internal rotation (SHERI): absent  Doe: positive (improving)  Log Roll: negative    Other   Sensation: normal    Comments:  Demonstrates incorrect form with Shai series, kicking straight back instead of 45 degrees. Still shows relatively poor single leg balance.   Left Hip Exam   Left hip exam is normal.    Tests   Pain w/ forced internal rotation (SHERI): absent    Other   Sensation: normal          Muscle Strength    Right Lower Extremity   Hip Abduction: 4/5   Hip Adduction: 5/5   Hip Flexion: 5/5   Quadriceps:  5/5   Hamstrin/5   Ankle Dorsiflexion:  5/5   Left Lower Extremity   Hip Abduction: 5/5   Hip Adduction: 5/5   Hip Flexion: 5/5   Quadriceps:  5/5   Hamstrin/5   Ankle Dorsiflexion:  5/5     Reflexes     Left Side  Quadriceps:  2+  Achilles:  2+    Right Side   Quadriceps:  2+  Achilles:  2+    Vascular Exam     Right Pulses  Dorsalis Pedis:      2+          Left Pulses  Dorsalis Pedis:      2+            IMPRESSION/PLAN: This is a 70 y.o.  male with:    Iliotibial band syndrome of right side    Chronic pain of right knee    Gait abnormality        The findings were discussed with Jasper in detail. We discussed that some of his symptoms are still suggestive of IT band syndrome and perhaps he just hasn't given rehab enough time to work yet. However, the sharp twinges he gets with subtle movements is less consistent with IT band and I'm not really sure where they are coming from. I offered to get an MRI to jimial in more detail, but he elected to hold off and keeping working on hip strength and stability. He'll let me know if this worsens or doesn't resolve and he wants imaging. I've discussed the plan with his PT.   He was provided with this plan in writing. All of his questions were answered. He will follow up with me PRN.     Suly Douglas M.D.  Physical Medicine and Rehab

## 2020-05-27 ENCOUNTER — TELEPHONE (OUTPATIENT)
Dept: ORTHOPEDICS | Facility: CLINIC | Age: 70
End: 2020-05-27

## 2020-06-03 ENCOUNTER — TELEPHONE (OUTPATIENT)
Dept: ORTHOPEDICS | Facility: CLINIC | Age: 70
End: 2020-06-03

## 2020-06-03 NOTE — TELEPHONE ENCOUNTER
Spoke to Winnie at [facility]. I informed her that we did not have the progress notes that she was requesting. I asked her to re-fax them so I could have them signed by Dr. Douglas. I informed her that as soon as he reviews and signed them, I'd fax them back to her. She verblaized understanding. MS         ----- Message from Nahomi Pollard sent at 6/3/2020 10:43 AM CDT -----  Contact: [facility](Winnie)-[phone]  Would like to consult with nurse regarding a progress note that was fax over May 18 and Apr 30. Need to be sign and fax back at [phone]. Thanks/ar

## 2020-06-09 ENCOUNTER — TELEPHONE (OUTPATIENT)
Dept: ORTHOPEDICS | Facility: CLINIC | Age: 70
End: 2020-06-09

## 2020-08-13 ENCOUNTER — TELEPHONE (OUTPATIENT)
Dept: ORTHOPEDICS | Facility: CLINIC | Age: 70
End: 2020-08-13

## 2021-03-29 PROBLEM — R80.9 PROTEINURIA: Status: ACTIVE | Noted: 2019-08-29

## 2021-03-29 PROBLEM — E78.5 HYPERLIPIDEMIA: Status: ACTIVE | Noted: 2019-08-29

## 2021-03-29 PROBLEM — N04.20 NEPHROTIC SYNDROME WITH MEMBRANOUS GLOMERULONEPHRITIS: Status: ACTIVE | Noted: 2019-08-29

## 2021-03-29 PROBLEM — E03.9 ACQUIRED HYPOTHYROIDISM: Status: ACTIVE | Noted: 2019-08-29

## 2021-03-29 PROBLEM — N18.1 STAGE 1 CHRONIC KIDNEY DISEASE: Status: ACTIVE | Noted: 2019-08-29

## 2022-06-24 DIAGNOSIS — U07.1 COVID-19 VIRUS DETECTED: ICD-10-CM
